# Patient Record
Sex: MALE | Race: WHITE | NOT HISPANIC OR LATINO | URBAN - METROPOLITAN AREA
[De-identification: names, ages, dates, MRNs, and addresses within clinical notes are randomized per-mention and may not be internally consistent; named-entity substitution may affect disease eponyms.]

---

## 2019-03-21 ENCOUNTER — OUTPATIENT (OUTPATIENT)
Dept: OUTPATIENT SERVICES | Facility: HOSPITAL | Age: 34
LOS: 1 days | Discharge: HOME | End: 2019-03-21

## 2019-03-29 LAB — SURGICAL PATHOLOGY STUDY: SIGNIFICANT CHANGE UP

## 2019-07-24 ENCOUNTER — OUTPATIENT (OUTPATIENT)
Dept: OUTPATIENT SERVICES | Facility: HOSPITAL | Age: 34
LOS: 1 days | Discharge: HOME | End: 2019-07-24

## 2019-07-29 LAB — SURGICAL PATHOLOGY STUDY: SIGNIFICANT CHANGE UP

## 2019-07-31 ENCOUNTER — EMERGENCY (EMERGENCY)
Facility: HOSPITAL | Age: 34
LOS: 1 days | Discharge: HOME | End: 2019-07-31
Attending: EMERGENCY MEDICINE | Admitting: EMERGENCY MEDICINE
Payer: COMMERCIAL

## 2019-07-31 VITALS
HEART RATE: 100 BPM | RESPIRATION RATE: 17 BRPM | DIASTOLIC BLOOD PRESSURE: 83 MMHG | WEIGHT: 175.05 LBS | TEMPERATURE: 98 F | SYSTOLIC BLOOD PRESSURE: 150 MMHG | OXYGEN SATURATION: 100 %

## 2019-07-31 PROCEDURE — 99283 EMERGENCY DEPT VISIT LOW MDM: CPT

## 2019-07-31 RX ORDER — IBUPROFEN 200 MG
600 TABLET ORAL ONCE
Refills: 0 | Status: COMPLETED | OUTPATIENT
Start: 2019-07-31 | End: 2019-07-31

## 2019-07-31 RX ADMIN — Medication 600 MILLIGRAM(S): at 00:43

## 2019-07-31 NOTE — ED PROVIDER NOTE - PHYSICAL EXAMINATION
CON: ao x 3, HENMT: clear oropharynx, soft neck, no chemosis, rhinorocket in place, balloon had been deflated, very gentle pressure used to remove the rhinorocket w/o any resistance during procedure, nares evaluated after, slight erythema and swelling, no obvious septal hematoma, no bleeding, tenderness to left ethmoid/maxillary sinus, HEAD: atraumatic, SKIN: no rash, NEURO: facial symmetry, full EOMI, perrl, pt feels slight less to touch over the left maxilla,

## 2019-07-31 NOTE — ED PROVIDER NOTE - NSFOLLOWUPINSTRUCTIONS_ED_ALL_ED_FT
Follow up with Dr. Mac this morning  Do NOT blow your nose  Apply bacitracin to the opening of your nostril but do not put foreign bodies into your nose  Return immediately for any new or worsening symptoms or any new concerns

## 2019-07-31 NOTE — ED PROVIDER NOTE - CLINICAL SUMMARY MEDICAL DECISION MAKING FREE TEXT BOX
postop pain, w/ rhinorocket in place, pt requesting to be removed, discussed the rationale behind ENT's intention for placing the packing, understands risks of worsening of existing condition w/ removal of packing done by ENT, pt understands risks, would like to proceed, instructed to f/u w/ ENT in the am or return here if worsening.  feels at this point, no indication for emergent imaging (pt on abx, afebrile, packing done today, limited nares exam w/o septal hematoma or obvious perf)

## 2019-07-31 NOTE — ED PROVIDER NOTE - OBJECTIVE STATEMENT
34 yom pw postop pain, pod 7 of septoplasty.  pt on augmentin.  pt went to ENT today for f/u, was told that the septum appears deviated/collapsed to the L.  at the time, ENT placed a rhinorocket in the L nares.  pt states after about 3-4 hours, experience pressure/pain and slight paresthesia, to the left cheek and L upper teeth.  no fc.  no blurry vision.  pt had deflated the balloon of the rhinorocket pta.

## 2019-08-04 DIAGNOSIS — G89.18 OTHER ACUTE POSTPROCEDURAL PAIN: ICD-10-CM

## 2019-11-03 NOTE — ED ADULT TRIAGE NOTE - TEMPERATURE IN FAHRENHEIT (DEGREES F)
Pt reports sleep and appetite are good  He rates anxiety and depression as 0  He denies S/H/I or AVH  He reports meds have been effective in helping with depression  He is compliant with meds and attends groups  He states he plans to go to [place] upon D/C then to [place] to live with dad  Will continue to monitor, provide support and encouragement  98
